# Patient Record
Sex: FEMALE | Race: WHITE | Employment: OTHER | URBAN - METROPOLITAN AREA
[De-identification: names, ages, dates, MRNs, and addresses within clinical notes are randomized per-mention and may not be internally consistent; named-entity substitution may affect disease eponyms.]

---

## 2024-05-22 ENCOUNTER — HOSPITAL ENCOUNTER (OUTPATIENT)
Dept: PHYSICAL THERAPY | Age: 89
Setting detail: RECURRING SERIES
Discharge: HOME | End: 2024-05-25
Payer: COMMERCIAL

## 2024-05-22 DIAGNOSIS — M79.604 PAIN IN RIGHT LEG: Primary | ICD-10-CM

## 2024-05-22 DIAGNOSIS — R29.898 RIGHT LEG WEAKNESS: ICD-10-CM

## 2024-05-22 DIAGNOSIS — M54.51 VERTEBROGENIC LOW BACK PAIN: ICD-10-CM

## 2024-05-22 DIAGNOSIS — M62.838 OTHER MUSCLE SPASM: ICD-10-CM

## 2024-05-22 PROCEDURE — 97162 PT EVAL MOD COMPLEX 30 MIN: CPT

## 2024-05-22 PROCEDURE — 97140 MANUAL THERAPY 1/> REGIONS: CPT

## 2024-05-22 PROCEDURE — 97110 THERAPEUTIC EXERCISES: CPT

## 2024-05-22 NOTE — PROGRESS NOTES
safety with daily activities .  THERAPEUTIC EXERCISE: (see below for minutes): Exercises per grid below to improve mobility, strength, balance and coordination. Required minimal verbal and manual cues to promote proper body alignment, promote proper body posture and promote proper body mechanics. Progressed resistance, range, repetitions and complexity of movement as indicated.  MANUAL THERAPY: (see below for minutes): Joint mobilization and Soft tissue mobilization was utilized and necessary because of the patient's restricted joint motion, painful spasm, loss of articular motion and restricted motion of soft tissue.   MODALITIES: (see below for minutes): to decrease pain   SELF CARE: (see below for minutes): Procedure(s) (per grid) utilized to improve and/or restore self-care/home management as related to dressing, bathing and grooming. Required minimal verbal cueing to facilitate activities of daily living skills and compensatory activities    Date: 5/22/24  Visit 1  Wk 1       Modalities:                                Therapeutic Exercise:                                                        Proprioceptive Activities:                                Manual Therapy:                        Functional Activities:                                            Treatment/Session Summary:    Treatment Assessment:   See evaluation note  Communication/Consultation:  Spoke with patient in regards to PT POC.  Equipment provided today:  HEP  Recommendations/Intent for next treatment session: Next visit will focus on hamstring mobility, progressive strengthening, lumbar mobility.    >Total Treatment Billable Duration:  45 minutes   Time In: 1100  Time Out: 1145    SAGE DOWELL PT         Charge Capture  [employer] Portal  Appt Desk  Attendance Report     Future Appointments   Date Time Provider Department Center   5/24/2024 10:45 AM Sage Dowell, PT [facility]   5/30/2024  9:30 AM Camilo Johnson PT [facility]   6/3/2024

## 2024-05-22 NOTE — THERAPY EVALUATION
Selam Hutchison  : 1935  Primary: [facility] (Commercial)  Secondary:  [facility][note 3] @ [place] [address]  Phone: [phone]  Fax: [phone] Plan Frequency: 1-2 sessions per week for 12 weeks    Plan of Care/Certification Expiration Date: 24        Plan of Care/Certification Expiration Date:  Plan of Care/Certification Expiration Date: 24    Frequency/Duration: Plan Frequency: 1-2 sessions per week for 12 weeks      Time In/Out:   Time In: 1100  Time Out: 1145      PT Visit Info:         Visit Count:  1                OUTPATIENT PHYSICAL THERAPY:             Initial Assessment 2024               Episode (Rehabilitation)         Treatment Diagnosis:     Pain in right leg  Vertebrogenic low back pain  Right leg weakness  Other muscle spasm  Medical/Referring Diagnosis:    Low back pain, unspecified  Other specified disorders of the skin and subcutaneous tissue  Elevated blood pressure reading without diagnosis of hypertension  Other seasonal allergic rhinitis  Other specified forms of tremor  Scoliosis, unspecified  Asthma    Referring Physician:  Marge Thomas, APRN - CNP  MD Orders:  PT Eval and Treat   Return MD Appt:  TBD  Date of Onset:  Onset Date: 24   Approx 1 month ago  Allergies:  Patient has no allergy information on record.  Restrictions/Precautions:    None      Medications Last Reviewed:  2024     SUBJECTIVE   History of Injury/Illness (Reason for Referral):  Patient is a pleasant 88 yr old female with complaints of right posterior thigh and hip pain. She also notes chronic low back pain. Patient notes that 1 month ago she began experiencing sharp pain in the posterior right thigh. She notes the pain is intermittent. It is aggravated when she bends forward, transfers from sit to stand, or lifting her leg. Walking eases her pain to a 0/10. Patient denies N/T in the leg or foot. No increase in

## 2024-05-23 ASSESSMENT — PAIN SCALES - GENERAL: PAINLEVEL_OUTOF10: 6

## 2024-05-24 ENCOUNTER — HOSPITAL ENCOUNTER (OUTPATIENT)
Dept: PHYSICAL THERAPY | Age: 89
Setting detail: RECURRING SERIES
Discharge: HOME | End: 2024-05-27
Payer: COMMERCIAL

## 2024-05-24 PROCEDURE — 97110 THERAPEUTIC EXERCISES: CPT

## 2024-05-24 PROCEDURE — 97140 MANUAL THERAPY 1/> REGIONS: CPT

## 2024-05-24 NOTE — PROGRESS NOTES
Selam Hutchison  : 1935  Primary: [facility] (Commercial)  Secondary:  [facility] @ [place] [address]  Phone: [phone]  Fax: [phone] Plan Frequency: 1-2 sessions per week for 12 weeks  Plan of Care/Certification Expiration Date: 24        Plan of Care/Certification Expiration Date:  Plan of Care/Certification Expiration Date: 24    Frequency/Duration: Plan Frequency: 1-2 sessions per week for 12 weeks      Time In/Out:   Time In: 1045  Time Out: 1130      PT Visit Info:         Visit Count:  2    OUTPATIENT PHYSICAL THERAPY:   Treatment Note 2024       Episode  (Rehabilitation)               Treatment Diagnosis:    Pain in right leg  Vertebrogenic low back pain  Right leg weakness  Other muscle spasm  Medical/Referring Diagnosis:    Low back pain, unspecified  Other specified disorders of the skin and subcutaneous tissue  Elevated blood pressure reading without diagnosis of hypertension  Other seasonal allergic rhinitis  Other specified forms of tremor  Scoliosis, unspecified  Asthma      Referring Physician:  Marge Thomas, APRN - CNP  MD Orders:  PT Eval and Treat   Return MD Appt:  TBD   Date of Onset:  Onset Date: 24   Approx 1 month ago  Allergies:   Patient has no allergy information on record.  Restrictions/Precautions:   None      Interventions Planned (Treatment may consist of any combination of the following):     See Assessment Note    Subjective Comments:   Patient notes that she is feeling sore today in the hamstring. She notes no increase in soreness post last session. The exercise is going well and notes that the tightness seems to improve with reps.   Initial Pain Level::     5 /10  Post Session Pain Level:      5  /10  Medications Last Reviewed:  2024  Updated Objective Findings:      24:  Lumbar flexion: painful  Segmental: UPA right L5 - reproduces some right hamstring

## 2024-05-30 ENCOUNTER — HOSPITAL ENCOUNTER (OUTPATIENT)
Dept: PHYSICAL THERAPY | Age: 89
Setting detail: RECURRING SERIES
End: 2024-05-30
Payer: COMMERCIAL

## 2024-05-30 PROCEDURE — 97110 THERAPEUTIC EXERCISES: CPT

## 2024-05-30 ASSESSMENT — PAIN SCALES - GENERAL: PAINLEVEL_OUTOF10: 3

## 2024-05-30 NOTE — PROGRESS NOTES
Selam Hutchison  : 1935  Primary: [facility] (Commercial)  Secondary:  [facility] @ [place] [address]  Phone: [phone]  Fax: [phone] Plan Frequency: 1-2 sessions per week for 12 weeks  Plan of Care/Certification Expiration Date: 24        Plan of Care/Certification Expiration Date:  Plan of Care/Certification Expiration Date: 24    Frequency/Duration: Plan Frequency: 1-2 sessions per week for 12 weeks      Time In/Out:   Time In: 935  Time Out: 1015      PT Visit Info:         Visit Count:  3    OUTPATIENT PHYSICAL THERAPY:   Treatment Note 2024       Episode  (Rehabilitation)               Treatment Diagnosis:    Pain in right leg  Vertebrogenic low back pain  Right leg weakness  Other muscle spasm  Medical/Referring Diagnosis:    Low back pain, unspecified  Other specified disorders of the skin and subcutaneous tissue  Elevated blood pressure reading without diagnosis of hypertension  Other seasonal allergic rhinitis  Other specified forms of tremor  Scoliosis, unspecified  Asthma      Referring Physician:  Marge Thomas, APRN - CNP  MD Orders:  PT Eval and Treat   Return MD Appt:  TBD   Date of Onset:  Onset Date: 24   Approx 1 month ago  Allergies:   Patient has no allergy information on record.  Restrictions/Precautions:   None      Interventions Planned (Treatment may consist of any combination of the following):     See Assessment Note    Subjective Comments:   Patient notes that pain comes and goes.  It is worse when she sits for a while and then gets up.   Initial Pain Level::     3/10  Post Session Pain Level:        (No worse)/10  Medications Last Reviewed:  2024  Updated Objective Findings:      24:  Lumbar flexion: painful  Segmental: UPA right L5 - reproduces some right hamstring symptoms (hypomobile)    Treatment   TREATMENT:   THERAPEUTIC ACTIVITY: ( see below for minutes):  Therapeutic activities per grid below to improve mobility, strength, balance and coordination. Required minimal visual, verbal, manual and tactile cues to improve independence and safety with daily activities .  THERAPEUTIC EXERCISE: (see below for minutes): Exercises per grid below to improve mobility, strength, balance and coordination. Required minimal verbal and manual cues to promote proper body alignment, promote proper body posture and promote proper body mechanics. Progressed resistance, range, repetitions and complexity of movement as indicated.  MANUAL THERAPY: (see below for minutes): Joint mobilization and Soft tissue mobilization was utilized and necessary because of the patient's restricted joint motion, painful spasm, loss of articular motion and restricted motion of soft tissue.   MODALITIES: (see below for minutes): to decrease pain   SELF CARE: (see below for minutes): Procedure(s) (per grid) utilized to improve and/or restore self-care/home management as related to dressing, bathing and grooming. Required minimal verbal cueing to facilitate activities of daily living skills and compensatory activities    Date: 5/22/24  Visit 1  Wk 1 5/24/24  Visit 2  Wk 1 5/30/24  Visit 3     Modalities:                                Therapeutic Exercise:  30 min 40 min       PPT x 30    Single knee to chest x 30 PPT 2x15    SKTC  B 1x15       Edu in HEP and progression DKTC  1x10          Bridging  2x10        PPT with marching  2x10        Side lying hip abduct  B 2x15        STS  ground 2x10        Clam shells  B 2x15     Proprioceptive Activities:                                Manual Therapy:  15 min -       UPA right L5 grade 3    STM right hamstring    Hip flexion and knee extension grade 2              Functional Activities:                                            Treatment/Session Summary:    Treatment Assessment:   Patient able to do all exercises without increased pain.

## 2024-06-03 ENCOUNTER — HOSPITAL ENCOUNTER (OUTPATIENT)
Dept: PHYSICAL THERAPY | Age: 89
Setting detail: RECURRING SERIES
Discharge: HOME | End: 2024-06-06
Payer: COMMERCIAL

## 2024-06-03 PROCEDURE — 97140 MANUAL THERAPY 1/> REGIONS: CPT

## 2024-06-03 PROCEDURE — 97110 THERAPEUTIC EXERCISES: CPT

## 2024-06-03 NOTE — PROGRESS NOTES
Selam Hutchison  : 1935  Primary: [facility] (Commercial)  Secondary:  [facility] @ [place] [address]  Phone: [phone]  Fax: [phone] Plan Frequency: 1-2 sessions per week for 12 weeks  Plan of Care/Certification Expiration Date: 24        Plan of Care/Certification Expiration Date:  Plan of Care/Certification Expiration Date: 24    Frequency/Duration: Plan Frequency: 1-2 sessions per week for 12 weeks      Time In/Out:   Time In: 1145  Time Out: 1230      PT Visit Info:         Visit Count:  4    OUTPATIENT PHYSICAL THERAPY:   Treatment Note 6/3/2024       Episode  (Rehabilitation)               Treatment Diagnosis:    Pain in right leg  Vertebrogenic low back pain  Right leg weakness  Other muscle spasm  Medical/Referring Diagnosis:    Low back pain, unspecified  Other specified disorders of the skin and subcutaneous tissue  Elevated blood pressure reading without diagnosis of hypertension  Other seasonal allergic rhinitis  Other specified forms of tremor  Scoliosis, unspecified  Asthma      Referring Physician:  Marge Thomas, APRN - CNP  MD Orders:  PT Eval and Treat   Return MD Appt:  TBD   Date of Onset:  Onset Date: 24   Approx 1 month ago  Allergies:   Patient has no allergy information on record.  Restrictions/Precautions:   None      Interventions Planned (Treatment may consist of any combination of the following):     See Assessment Note    Subjective Comments:   Patient notes that she is feeling well.   Initial Pain Level::     6-7 /10  Post Session Pain Level:      0  /10  Medications Last Reviewed:  6/3/2024  Updated Objective Findings:      24:  Lumbar flexion: painful  Segmental: UPA right L5 - reproduces some right hamstring symptoms (hypomobile)    6/3/24:  Lumbar flexion: non painful  STS from 21 inch: non painful     Treatment   TREATMENT:   THERAPEUTIC ACTIVITY: ( see below for

## 2024-06-05 ENCOUNTER — HOSPITAL ENCOUNTER (OUTPATIENT)
Dept: PHYSICAL THERAPY | Age: 89
Setting detail: RECURRING SERIES
Discharge: HOME | End: 2024-06-08
Payer: COMMERCIAL

## 2024-06-05 PROCEDURE — 97110 THERAPEUTIC EXERCISES: CPT

## 2024-06-05 PROCEDURE — 97140 MANUAL THERAPY 1/> REGIONS: CPT

## 2024-06-05 NOTE — PROGRESS NOTES
2x15     Proprioceptive Activities:                                Manual Therapy:  15 min - 15 min 15 min     UPA right L5 grade 3    STM right hamstring    Hip flexion and knee extension grade 2  UPA right L5 grade 3    STM right hamstring    Hip flexion and knee extension grade 2    UPA right L5 grade 3    STM right hamstring, right glut med/piriformis    Hip flexion and knee extension grade 2    Sciatic tensioners supine grade 3              Functional Activities:                                            Treatment/Session Summary:    Treatment Assessment:   Patient progressed to global conditioning program including some single leg challenges with standing marches for balance. Patient was walking better and had less pain post session.     Communication/Consultation:  None today  Equipment provided today:  HEP  Recommendations/Intent for next treatment session: endurance/hypertrophy loads phase, continued mobility    >Total Treatment Billable Duration:  45 minutes   Time In: 1145  Time Out: 1230    SAGE DOWELL PT         Charge Capture  [facility] Portal  Appt Desk  Attendance Report     Future Appointments   Date Time Provider Department Center   6/12/2024 11:45 AM Sage Dowell, PT [facility]   6/14/2024  9:15 AM Sage Dowell, PT [facility]   6/17/2024 11:45 AM Sage Dowell, PT [facility]   6/19/2024 11:45 AM Sage Dowell, PT [facility]   6/19/2024  1:30 PM Jagdish Nails, DO [facility]   6/24/2024 11:45 AM Sage Dowell, PT [facility]   6/26/2024 11:45 AM Sage Dowell, PT [facility]

## 2024-06-10 ENCOUNTER — APPOINTMENT (OUTPATIENT)
Dept: PHYSICAL THERAPY | Age: 89
End: 2024-06-10
Payer: COMMERCIAL

## 2024-06-12 ENCOUNTER — HOSPITAL ENCOUNTER (OUTPATIENT)
Dept: PHYSICAL THERAPY | Age: 89
Setting detail: RECURRING SERIES
Discharge: HOME | End: 2024-06-15
Payer: COMMERCIAL

## 2024-06-12 PROCEDURE — 97140 MANUAL THERAPY 1/> REGIONS: CPT

## 2024-06-12 PROCEDURE — 97110 THERAPEUTIC EXERCISES: CPT

## 2024-06-12 NOTE — PROGRESS NOTES
10    Heel raises 2 x 10    STS 20 inches 2 x 10      PPT with marching  2x10   HEP edu concerning piriformis stretch 3 x 30 sec for home      Side lying hip abduct  B 2x15         STS  ground 2x10         Clam shells  B 2x15      Proprioceptive Activities:                                    Manual Therapy:  15 min - 15 min 15 min 15 min     UPA right L5 grade 3    STM right hamstring    Hip flexion and knee extension grade 2  UPA right L5 grade 3    STM right hamstring    Hip flexion and knee extension grade 2    UPA right L5 grade 3    STM right hamstring, right glut med/piriformis    Hip flexion and knee extension grade 2    Sciatic tensioners supine grade 3    UPA right L5 grade 3    STM right hamstring, right glut med/piriformis    Hip flexion and knee extension grade 2    Sciatic tensioners supine grade 3            Functional Activities:                                                 Treatment/Session Summary:    Treatment Assessment:   Patient responded well to piriformis stretching and had no pain with forward bending following stretches. Added piriformis stretch to hep. Patient had no increased pain with exercises today.     Communication/Consultation:  None today  Equipment provided today:  HEP  Recommendations/Intent for next treatment session: endurance/hypertrophy loads phase, continued mobility    >Total Treatment Billable Duration:  45 minutes   Time In: 1100  Time Out: 1145    SAGE DOWELL PT         Charge Capture  [employer] Portal  Appt Desk  Attendance Report     Future Appointments   Date Time Provider Department Center   6/14/2024  9:15 AM Sage Dowell, PT [facility]   6/17/2024 11:45 AM Sage Dowell, PT [facility]   6/19/2024 11:45 AM Sage Dowell, PT [facility]   6/19/2024  1:30 PM Jagdish Nails, DO [facility]   6/24/2024 11:45 AM Sage Dowell, PT [facility]   6/26/2024 11:45 AM Sage Dowell, PT [facility]

## 2024-06-14 ENCOUNTER — HOSPITAL ENCOUNTER (OUTPATIENT)
Dept: PHYSICAL THERAPY | Age: 89
Setting detail: RECURRING SERIES
Discharge: HOME | End: 2024-06-17
Payer: COMMERCIAL

## 2024-06-14 PROCEDURE — 97140 MANUAL THERAPY 1/> REGIONS: CPT

## 2024-06-14 PROCEDURE — 97110 THERAPEUTIC EXERCISES: CPT

## 2024-06-14 NOTE — PROGRESS NOTES
Selam Hutchison  : 1935  Primary: [facility] (Commercial)  Secondary:  [facility] @ [place] [address]  Phone: [phone]  Fax: [phone] Plan Frequency: 1-2 sessions per week for 12 weeks  Plan of Care/Certification Expiration Date: 24        Plan of Care/Certification Expiration Date:  Plan of Care/Certification Expiration Date: 24    Frequency/Duration: Plan Frequency: 1-2 sessions per week for 12 weeks      Time In/Out:   Time In: 915  Time Out: 1015      PT Visit Info:         Visit Count:  7    OUTPATIENT PHYSICAL THERAPY:   Treatment Note 2024       Episode  (Rehabilitation)               Treatment Diagnosis:    Pain in right leg  Vertebrogenic low back pain  Right leg weakness  Other muscle spasm  Medical/Referring Diagnosis:    Low back pain, unspecified  Other specified disorders of the skin and subcutaneous tissue  Elevated blood pressure reading without diagnosis of hypertension  Other seasonal allergic rhinitis  Other specified forms of tremor  Scoliosis, unspecified  Asthma      Referring Physician:  Marge Thomas, APRN - CNP  MD Orders:  PT Eval and Treat   Return MD Appt:  TBD   Date of Onset:  Onset Date: 24   Approx 1 month ago  Allergies:   Patient has no allergy information on record.  Restrictions/Precautions:   None      Interventions Planned (Treatment may consist of any combination of the following):     See Assessment Note    Subjective Comments:   STS continues to be painful. Piriformis stretching. Patient notes that she has been having some unsteadiness and light headedness. She notes that she notices the unsteadiness after standing and looking upward or down. She notes that she had high bp at her last doctors appointment. She notes that after a few moments the unsteadiness eases.     Initial Pain Level::     2 /10  Post Session Pain Level:      0  /10  Medications Last Reviewed:

## 2024-06-17 ENCOUNTER — HOSPITAL ENCOUNTER (OUTPATIENT)
Dept: PHYSICAL THERAPY | Age: 89
Setting detail: RECURRING SERIES
Discharge: HOME | End: 2024-06-20
Payer: COMMERCIAL

## 2024-06-17 PROCEDURE — 97140 MANUAL THERAPY 1/> REGIONS: CPT

## 2024-06-17 PROCEDURE — 97110 THERAPEUTIC EXERCISES: CPT

## 2024-06-17 NOTE — PROGRESS NOTES
2x15    SKTC  B 1x15 Single knee to chest x 2 min    PPT x 2 min    Supine DF x 30     Single knee to chest x 2 min    PPT x 2 min    Supine DF x 30        PPT x 2 min    Single knee to chest x 1 min    Supine DF x 30 Review of symptoms related to vestibular difficulty    Reviewed recommendation to return to PCP PPT x 2 min    Single knee to chest x 1 min    Supine DF x 30 x 30 deg    Piriformis stretch 3 x 30 sec    Bent knee hamstring stretch 3 x 30 sec        Edu in HEP and progression DKTC  1x10   Clams 1x15  [id number] Clams 2 x 15 x RTB Clams 3 x 10 x GTB    Bridges 2 x 10 Education regarding POC Clams 2 x 10 x GTB    Bridges 2 x 10 x GTB iso abd         Bridging  2x10 STS 21 inches  2 x 10 STS 21 inches  2 x 10    Standing marches 2 x 10    Heel raises 2 x 10    STS 20 inches 2 x 10  STS 2 x 10 x 5#      Standing marches 2 x 10             PPT with marching  2x10   HEP edu concerning piriformis stretch 3 x 30 sec for home        Side lying hip abduct  B 2x15           STS  ground 2x10           Clam shells  B 2x15        Proprioceptive Activities:                                            Manual Therapy:  15 min - 15 min 15 min 15 min 15 min 15 min     UPA right L5 grade 3    STM right hamstring    Hip flexion and knee extension grade 2  UPA right L5 grade 3    STM right hamstring    Hip flexion and knee extension grade 2    UPA right L5 grade 3    STM right hamstring, right glut med/piriformis    Hip flexion and knee extension grade 2    Sciatic tensioners supine grade 3    UPA right L5 grade 3    STM right hamstring, right glut med/piriformis    Hip flexion and knee extension grade 2    Sciatic tensioners supine grade 3 UPA right L5 grade 3    STM right hamstring, right glut med/piriformis    Hip flexion and knee extension grade 2    Hamstring stretch 3 x 30 sec with knee bent UPA right L5 grade 3    STM right hamstring, right glut med/piriformis    Hip flexion and knee extension grade 2

## 2024-06-19 ENCOUNTER — HOSPITAL ENCOUNTER (OUTPATIENT)
Dept: PHYSICAL THERAPY | Age: 89
Setting detail: RECURRING SERIES
Discharge: HOME | End: 2024-06-22
Payer: COMMERCIAL

## 2024-06-19 ENCOUNTER — OFFICE VISIT (OUTPATIENT)
Dept: INTERNAL MEDICINE CLINIC | Facility: CLINIC | Age: 89
End: 2024-06-19
Payer: COMMERCIAL

## 2024-06-19 VITALS
HEIGHT: 65 IN | TEMPERATURE: 98 F | DIASTOLIC BLOOD PRESSURE: 70 MMHG | WEIGHT: 123 LBS | SYSTOLIC BLOOD PRESSURE: 96 MMHG | OXYGEN SATURATION: 98 % | HEART RATE: 82 BPM | BODY MASS INDEX: 20.49 KG/M2

## 2024-06-19 DIAGNOSIS — Z91.81 AT HIGH RISK FOR FALLS: ICD-10-CM

## 2024-06-19 DIAGNOSIS — G25.0 ESSENTIAL TREMOR: ICD-10-CM

## 2024-06-19 DIAGNOSIS — Z12.31 ENCOUNTER FOR SCREENING MAMMOGRAM FOR MALIGNANT NEOPLASM OF BREAST: Primary | ICD-10-CM

## 2024-06-19 DIAGNOSIS — R42 DIZZINESS: ICD-10-CM

## 2024-06-19 DIAGNOSIS — R42 ORTHOSTATIC DIZZINESS: ICD-10-CM

## 2024-06-19 DIAGNOSIS — R42 VERTIGO: ICD-10-CM

## 2024-06-19 PROBLEM — M41.9 SCOLIOSIS, UNSPECIFIED: Status: ACTIVE | Noted: 2024-06-19

## 2024-06-19 PROBLEM — J30.2 SEASONAL ALLERGIES: Status: ACTIVE | Noted: 2024-06-19

## 2024-06-19 LAB
CHOLEST SERPL-MCNC: 222 MG/DL (ref 0–200)
ERYTHROCYTE [DISTWIDTH] IN BLOOD BY AUTOMATED COUNT: 14.7 % (ref 11.9–14.6)
HCT VFR BLD AUTO: 39.2 % (ref 35.8–46.3)
HDLC SERPL-MCNC: 68 MG/DL (ref 40–60)
HDLC SERPL: 3.2 (ref 0–5)
HGB BLD-MCNC: 12.2 G/DL (ref 11.7–15.4)
LDLC SERPL CALC-MCNC: 127 MG/DL (ref 0–100)
MCH RBC QN AUTO: 28.1 PG (ref 26.1–32.9)
MCHC RBC AUTO-ENTMCNC: 31.1 G/DL (ref 31.4–35)
MCV RBC AUTO: 90.3 FL (ref 82–102)
NRBC # BLD: 0 K/UL (ref 0–0.2)
PLATELET # BLD AUTO: 304 K/UL (ref 150–450)
PMV BLD AUTO: 9.6 FL (ref 9.4–12.3)
RBC # BLD AUTO: 4.34 M/UL (ref 4.05–5.2)
TRIGL SERPL-MCNC: 132 MG/DL (ref 0–150)
TSH, 3RD GENERATION: 2.1 UIU/ML (ref 0.27–4.2)
VLDLC SERPL CALC-MCNC: 26 MG/DL (ref 6–23)
WBC # BLD AUTO: 9 K/UL (ref 4.3–11.1)

## 2024-06-19 PROCEDURE — 1036F TOBACCO NON-USER: CPT | Performed by: INTERNAL MEDICINE

## 2024-06-19 PROCEDURE — 97110 THERAPEUTIC EXERCISES: CPT

## 2024-06-19 PROCEDURE — 99214 OFFICE O/P EST MOD 30 MIN: CPT | Performed by: INTERNAL MEDICINE

## 2024-06-19 PROCEDURE — 1123F ACP DISCUSS/DSCN MKR DOCD: CPT | Performed by: INTERNAL MEDICINE

## 2024-06-19 PROCEDURE — 1090F PRES/ABSN URINE INCON ASSESS: CPT | Performed by: INTERNAL MEDICINE

## 2024-06-19 PROCEDURE — G8427 DOCREV CUR MEDS BY ELIG CLIN: HCPCS | Performed by: INTERNAL MEDICINE

## 2024-06-19 PROCEDURE — 97140 MANUAL THERAPY 1/> REGIONS: CPT

## 2024-06-19 PROCEDURE — 93000 ELECTROCARDIOGRAM COMPLETE: CPT | Performed by: INTERNAL MEDICINE

## 2024-06-19 PROCEDURE — G8420 CALC BMI NORM PARAMETERS: HCPCS | Performed by: INTERNAL MEDICINE

## 2024-06-19 RX ORDER — MECLIZINE HYDROCHLORIDE 25 MG/1
25 TABLET ORAL 3 TIMES DAILY PRN
Qty: 30 TABLET | Refills: 1 | Status: SHIPPED | OUTPATIENT
Start: 2024-06-19

## 2024-06-19 RX ORDER — MONTELUKAST SODIUM 10 MG/1
10 TABLET ORAL
COMMUNITY
Start: 2023-08-02 | End: 2024-06-19 | Stop reason: SDUPTHER

## 2024-06-19 RX ORDER — DIPHENHYDRAMINE HYDROCHLORIDE 25 MG/1
1 TABLET ORAL DAILY
COMMUNITY

## 2024-06-19 RX ORDER — RALOXIFENE HYDROCHLORIDE 60 MG/1
60 TABLET, FILM COATED ORAL DAILY
COMMUNITY
Start: 2023-06-29 | End: 2024-06-19 | Stop reason: SDUPTHER

## 2024-06-19 RX ORDER — TRAZODONE HYDROCHLORIDE 50 MG/1
50 TABLET ORAL NIGHTLY
COMMUNITY
Start: 2023-10-17

## 2024-06-19 RX ORDER — PROPRANOLOL HCL 60 MG
60 CAPSULE, EXTENDED RELEASE 24HR ORAL DAILY
Qty: 90 CAPSULE | Refills: 3 | Status: SHIPPED | OUTPATIENT
Start: 2024-06-19

## 2024-06-19 RX ORDER — PROPRANOLOL HYDROCHLORIDE 80 MG/1
80 CAPSULE, EXTENDED RELEASE ORAL DAILY
COMMUNITY
Start: 2024-02-18 | End: 2024-06-19 | Stop reason: SDUPTHER

## 2024-06-19 RX ORDER — MONTELUKAST SODIUM 10 MG/1
10 TABLET ORAL NIGHTLY
Qty: 90 TABLET | Refills: 3 | Status: SHIPPED | OUTPATIENT
Start: 2024-06-19

## 2024-06-19 RX ORDER — RALOXIFENE HYDROCHLORIDE 60 MG/1
60 TABLET, FILM COATED ORAL DAILY
Qty: 90 TABLET | Refills: 3 | Status: SHIPPED | OUTPATIENT
Start: 2024-06-19

## 2024-06-19 RX ORDER — MECLIZINE HYDROCHLORIDE 25 MG/1
25 TABLET ORAL 3 TIMES DAILY PRN
COMMUNITY
Start: 2022-07-14 | End: 2024-06-19 | Stop reason: SDUPTHER

## 2024-06-19 SDOH — ECONOMIC STABILITY: FOOD INSECURITY: WITHIN THE PAST 12 MONTHS, YOU WORRIED THAT YOUR FOOD WOULD RUN OUT BEFORE YOU GOT MONEY TO BUY MORE.: NEVER TRUE

## 2024-06-19 SDOH — ECONOMIC STABILITY: FOOD INSECURITY: WITHIN THE PAST 12 MONTHS, THE FOOD YOU BOUGHT JUST DIDN'T LAST AND YOU DIDN'T HAVE MONEY TO GET MORE.: NEVER TRUE

## 2024-06-19 SDOH — ECONOMIC STABILITY: INCOME INSECURITY: HOW HARD IS IT FOR YOU TO PAY FOR THE VERY BASICS LIKE FOOD, HOUSING, MEDICAL CARE, AND HEATING?: NOT HARD AT ALL

## 2024-06-19 SDOH — ECONOMIC STABILITY: HOUSING INSECURITY
IN THE LAST 12 MONTHS, WAS THERE A TIME WHEN YOU DID NOT HAVE A STEADY PLACE TO SLEEP OR SLEPT IN A SHELTER (INCLUDING NOW)?: NO

## 2024-06-19 ASSESSMENT — PATIENT HEALTH QUESTIONNAIRE - PHQ9
SUM OF ALL RESPONSES TO PHQ QUESTIONS 1-9: 0
1. LITTLE INTEREST OR PLEASURE IN DOING THINGS: NOT AT ALL
SUM OF ALL RESPONSES TO PHQ9 QUESTIONS 1 & 2: 0
2. FEELING DOWN, DEPRESSED OR HOPELESS: NOT AT ALL
SUM OF ALL RESPONSES TO PHQ QUESTIONS 1-9: 0

## 2024-06-19 NOTE — PROGRESS NOTES
Selam was seen today for established new doctor, medication refill and hypertension.    Diagnoses and all orders for this visit:    Encounter for screening mammogram for malignant neoplasm of breast  -     [facility] DIGITAL SCREEN W OR WO CAD BILATERAL; Future    At high risk for falls  -     [facility] - Physical Therapy, [facility]    Essential tremor  -     Lipid Panel; Future  -     Lipid Panel    Dizziness  -     [facility] - Physical Therapy, [facility]  -     CBC; Future  -     TSH; Future  -     Lipid Panel; Future  -     EKG 12 Lead  -     Lipid Panel  -     TSH  -     CBC    Orthostatic dizziness  -     [facility] - Physical Therapy, [facility]  -     TSH; Future  -     Lipid Panel; Future  -     Lipid Panel  -     TSH    Vertigo  -     [facility] - Physical Therapy, [facility]    Other orders  -     montelukast (SINGULAIR) 10 MG tablet; Take 1 tablet by mouth nightly  -     raloxifene (EVISTA) 60 MG tablet; Take 1 tablet by mouth daily  -     meclizine (ANTIVERT) 25 MG tablet; Take 1 tablet by mouth 3 times daily as needed for Dizziness  -     propranolol (INDERAL LA) 60 MG extended release capsule; Take 1 capsule by mouth daily          Selam Hutchison is a 88 y.o. female    Chief Complaint   Patient presents with    Established New Doctor     Changing PCP due to insurance    Medication Refill    Hypertension     Per physical therapist     Going to pt at [facility] for back pain  Has sciatica and scoliosis  Vertigo-gets dizzy when rolls over in bed  Has dizzy spell when walking just a short distance-lightheaded    Spinning dizziness sai happens every 4 weeks    Enjoys reading  Driving mostly local .   Family here .sister [place] with dementia  Wt Readings from Last 3 Encounters:   06/19/24 55.8 kg (123 lb)       Goals-to improve dizziness work in yard and walking  Wants to get balance and dizziness therapy          No results found for any previous visit.

## 2024-06-19 NOTE — PROGRESS NOTES
Selam Hutchison  : 1935  Primary: [facility] (Commercial)  Secondary:  [facility] @ [place] [address]  Phone: [phone]  Fax: [phone] Plan Frequency: 1-2 sessions per week for 12 weeks  Plan of Care/Certification Expiration Date: 24        Plan of Care/Certification Expiration Date:  Plan of Care/Certification Expiration Date: 24    Frequency/Duration: Plan Frequency: 1-2 sessions per week for 12 weeks      Time In/Out:   Time In: 1145  Time Out: 1230      PT Visit Info:         Visit Count:  9    OUTPATIENT PHYSICAL THERAPY:   Treatment Note 2024       Episode  (Rehabilitation)               Treatment Diagnosis:    Pain in right leg  Vertebrogenic low back pain  Right leg weakness  Other muscle spasm  Medical/Referring Diagnosis:    Low back pain, unspecified  Other specified disorders of the skin and subcutaneous tissue  Elevated blood pressure reading without diagnosis of hypertension  Other seasonal allergic rhinitis  Other specified forms of tremor  Scoliosis, unspecified  Asthma      Referring Physician:  Marge Thomas, APRN - CNP  MD Orders:  PT Eval and Treat   Return MD Appt:  TBD   Date of Onset:  Onset Date: 24   Approx 1 month ago  Allergies:   Hydrocodone-acetaminophen  Restrictions/Precautions:   None      Interventions Planned (Treatment may consist of any combination of the following):     See Assessment Note    Subjective Comments:   Patient notes she notes that was able to tight left shoe without pain today. Right shoe was slightly tight. Patient notes she would like to work on getting back to working in the yard.     Initial Pain Level::     0 /10  Post Session Pain Level:      0  /10  Medications Last Reviewed:  2024  Updated Objective Findings:      24:  Lumbar flexion: painful  Segmental: UPA right L5 - reproduces some right hamstring symptoms (hypomobile)    6/3/24:  Lumbar

## 2024-06-24 ENCOUNTER — HOSPITAL ENCOUNTER (OUTPATIENT)
Dept: PHYSICAL THERAPY | Age: 89
Setting detail: RECURRING SERIES
Discharge: HOME | End: 2024-06-27
Payer: COMMERCIAL

## 2024-06-24 PROCEDURE — 97140 MANUAL THERAPY 1/> REGIONS: CPT

## 2024-06-24 PROCEDURE — 97110 THERAPEUTIC EXERCISES: CPT

## 2024-06-24 NOTE — PROGRESS NOTES
Selam Hutchison  : 1935  Primary: [facility] (Commercial)  Secondary:  [facility] [place] [address]  Phone: [phone]  Fax: [phone] Plan Frequency: 1-2 sessions per week for 12 weeks  Plan of Care/Certification Expiration Date: 24        Plan of Care/Certification Expiration Date:  Plan of Care/Certification Expiration Date: 24    Frequency/Duration: Plan Frequency: 1-2 sessions per week for 12 weeks      Time In/Out:   Time In: 1145  Time Out: 1230      PT Visit Info:         Visit Count:  10    OUTPATIENT PHYSICAL THERAPY:   Treatment Note 2024       Episode  (Rehabilitation)               Treatment Diagnosis:    Pain in right leg  Vertebrogenic low back pain  Right leg weakness  Other muscle spasm  Medical/Referring Diagnosis:    Low back pain, unspecified  Other specified disorders of the skin and subcutaneous tissue  Elevated blood pressure reading without diagnosis of hypertension  Other seasonal allergic rhinitis  Other specified forms of tremor  Scoliosis, unspecified  Asthma      Referring Physician:  Marge Thomas, APRN - CNP  MD Orders:  PT Eval and Treat   Return MD Appt:  TBD   Date of Onset:  Onset Date: 24   Approx 1 month ago  Allergies:   Hydrocodone-acetaminophen  Restrictions/Precautions:   None      Interventions Planned (Treatment may consist of any combination of the following):     See Assessment Note    Subjective Comments:   Patient notes some soreness in the right lateral hip. Her hamstring has not been painful. Patient notes the symptoms ease after stretching. Patient had her appointment with PCP. She was noted to have orthostatic hypotension.    Initial Pain Level::     0 /10  Post Session Pain Level:      0  /10  Medications Last Reviewed:  2024  Updated Objective Findings:      24:  Lumbar flexion: painful  Segmental: UPA right L5 - reproduces some right hamstring

## 2024-06-26 ENCOUNTER — HOSPITAL ENCOUNTER (OUTPATIENT)
Dept: PHYSICAL THERAPY | Age: 89
Setting detail: RECURRING SERIES
Discharge: HOME | End: 2024-06-29
Payer: COMMERCIAL

## 2024-06-26 PROCEDURE — 97110 THERAPEUTIC EXERCISES: CPT

## 2024-06-26 PROCEDURE — 97140 MANUAL THERAPY 1/> REGIONS: CPT

## 2024-06-26 NOTE — PROGRESS NOTES
Selam Hutchison  : 1935  Primary: [facility] (Commercial)  Secondary:  [facility] @ [place] [address]  Phone: [phone]  Fax: [phone] Plan Frequency: 1-2 sessions per week for 12 weeks  Plan of Care/Certification Expiration Date: 24        Plan of Care/Certification Expiration Date:  Plan of Care/Certification Expiration Date: 24    Frequency/Duration: Plan Frequency: 1-2 sessions per week for 12 weeks      Time In/Out:   Time In: 1145  Time Out: 1230      PT Visit Info:         Visit Count:  11    OUTPATIENT PHYSICAL THERAPY:   Treatment Note 2024       Episode  (Rehabilitation)               Treatment Diagnosis:    Pain in right leg  Vertebrogenic low back pain  Right leg weakness  Other muscle spasm  Medical/Referring Diagnosis:    Low back pain, unspecified  Other specified disorders of the skin and subcutaneous tissue  Elevated blood pressure reading without diagnosis of hypertension  Other seasonal allergic rhinitis  Other specified forms of tremor  Scoliosis, unspecified  Asthma      Referring Physician:  Marge Thomas, APRN - CNP  MD Orders:  PT Eval and Treat   Return MD Appt:  TBD   Date of Onset:  Onset Date: 24   Approx 1 month ago  Allergies:   Hydrocodone-acetaminophen  Restrictions/Precautions:   None      Interventions Planned (Treatment may consist of any combination of the following):     See Assessment Note    Subjective Comments:   Patient notes she is a little sore in the posterior hip today.     Initial Pain Level::     0 /10  Post Session Pain Level:      0  /10  Medications Last Reviewed:  2024  Updated Objective Findings:      24:  Lumbar flexion: painful  Segmental: UPA right L5 - reproduces some right hamstring symptoms (hypomobile)    6/3/24:  Lumbar flexion: non painful  STS from 21 inch: non painful     24:   Seated flexion to shoes: left no pain, right some

## 2024-07-03 ENCOUNTER — HOSPITAL ENCOUNTER (OUTPATIENT)
Dept: PHYSICAL THERAPY | Age: 89
Setting detail: RECURRING SERIES
Discharge: HOME | End: 2024-07-06
Payer: COMMERCIAL

## 2024-07-03 PROCEDURE — 97140 MANUAL THERAPY 1/> REGIONS: CPT

## 2024-07-03 PROCEDURE — 97110 THERAPEUTIC EXERCISES: CPT

## 2024-07-03 NOTE — PROGRESS NOTES
[email or web address] Portal  Appt Desk  Attendance Report     Future Appointments   Date Time Provider Department Center   7/8/2024  3:30 PM Sage Dowell, PT [facility]   7/10/2024  4:15 PM Sage Dowell, PT [facility]   7/15/2024  3:30 PM Sage Dowell, PT [facility]   7/16/2024 11:45 AM Jagdish Nails, DO [facility]   7/17/2024 11:30 AM [facility] MOBILE MAMMO 1 [facility]   7/17/2024  4:15 PM Sage Dowell, PT [facility]   7/22/2024  3:30 PM Sage Dowell, PT [facility]   7/24/2024  2:45 PM Camilo Johnson, PT [facility]   7/29/2024  3:30 PM Sage Dowell, PT [facility]   7/31/2024  4:15 PM Sage Dowell, PT [facility]

## 2024-07-08 ENCOUNTER — HOSPITAL ENCOUNTER (OUTPATIENT)
Dept: PHYSICAL THERAPY | Age: 89
Setting detail: RECURRING SERIES
Discharge: HOME | End: 2024-07-11
Payer: COMMERCIAL

## 2024-07-08 PROCEDURE — 97140 MANUAL THERAPY 1/> REGIONS: CPT

## 2024-07-08 PROCEDURE — 97110 THERAPEUTIC EXERCISES: CPT

## 2024-07-08 NOTE — PROGRESS NOTES
knee to chest x 1 min    Supine DF x 30 x 30 deg    Piriformis stretch 3 x 30 sec    Bent knee hamstring stretch 3 x 30 sec    Seated lumbar flexion x 20    PPT x 2 min    Single knee to chest x 1 min    Supine DF x 30 x 30 deg    Piriformis stretch 3 x 30 sec    Bent knee hamstring stretch 3 x 30 sec Seated lumbar flexion x 10    PPT x 2 min    Piriformis stretch 3 x 30 sec    Bent knee hamstring stretch 3 x 30 sec    PPT x 2 min    SKTC x 2 min    Piriformis stretch 3 x 30 sec Single knee to chest stretch 10 sec hold x6 B    B piriformis stretch 30 sec hold x4    PPT x15    Bent knee hamstring stretch B LE PPT x 2 min    SKTC x 2 min    Piriformis stretch 3 x 30 sec       Clams 2 x 10 x GTB    Bridges 2 x 10 x GTB iso abd       Side lying clamshells 3x10 B Hamstring isometrics sitting 2 x 45 sec    Seated hamstring curls 3 x 10 x GTB    STS 2 x 10 x 5#      Standing marches 2 x 10        Deadlift patterning 2 x 10 no wt  1 x 10 x 7.5# Deadlift patterning 2 x 10 no wt  1 x 10 x 7.5#    Standing marches 2 x 10      Bridges 2 x 10 x GTB    Clams 2 x 10 x GTB    Clams 3 x 10 x GTB Low bridges 3x10 Deadlift patterning 2 x 10 no wt  1 x 10 x 7.5#    Heel raises x 30                                         Proprioceptive Activities:                                    Manual Therapy: 15 min 15 min 15 min 30 min 12 min 15 min    UPA right L5 grade 3    STM right hamstring, right glut med/piriformis    Hip flexion and knee extension grade 2    UPA right L5 grade 3    STM right hamstring, right glut med/piriformis    Hip flexion and knee extension grade 2    UPA right L5 grade 3    STM right hamstring, right glut med/piriformis    Hip flexion and knee extension grade 2    UPA right L5 grade 3    STM right hamstring, right glut med/piriformis    Hip flexion and knee extension grade 2    IASTM to R hamstring, glutes, piriformis with roller in side lying STM right hamstring, right glut med/piriformis            Functional

## 2024-07-10 ENCOUNTER — HOSPITAL ENCOUNTER (OUTPATIENT)
Dept: PHYSICAL THERAPY | Age: 89
Setting detail: RECURRING SERIES
Discharge: HOME | End: 2024-07-13
Payer: COMMERCIAL

## 2024-07-10 PROCEDURE — 97110 THERAPEUTIC EXERCISES: CPT

## 2024-07-10 PROCEDURE — 97140 MANUAL THERAPY 1/> REGIONS: CPT

## 2024-07-10 NOTE — PROGRESS NOTES
knee extension grade 2    IASTM to R hamstring, glutes, piriformis with roller in side lying STM right hamstring, right glut med/piriformis STM right hamstring, right glut med/piriformis                Functional Activities:                                                      Treatment/Session Summary:    Treatment Assessment:   See d/c note    Communication/Consultation:  None today  Equipment provided today:  HEP  Recommendations/Intent for next treatment session: endurance/hypertrophy loads phase, continued mobility    >Total Treatment Billable Duration:  45 minutes   Time In: 0415  Time Out: 0500    SAGE DOWELL PT         Charge Capture  [facility] Portal  Appt Desk  Attendance Report     Future Appointments   Date Time Provider Department Center   7/15/2024  3:30 PM Sage Dowell, PT [facility]   7/16/2024 11:45 AM Jagdish Nails, DO [facility]   7/17/2024 11:30 AM [facility] MOBILE MAMMO 1 [facility]   7/17/2024  4:15 PM Sage Dowell, PT [facility]   7/22/2024  3:30 PM Sage Dowell, PT [facility]   7/24/2024  2:00 PM Sage Dowell, PT [facility]   7/29/2024  3:30 PM Sage Dowell, PT [facility]   7/31/2024  4:15 PM Sage Dowell, PT [facility]

## 2024-07-11 NOTE — THERAPY DISCHARGE
QOL.      Regarding Selam Hutchison's therapy, I certify that the treatment plan above will be carried out by a therapist or under their direction.  Thank you for this referral,  REAL KIM PT     Referring Physician Signature: Marge Thomas, APR* _______________________________ Date _____________        Charge Capture  Appt Desk  Attendance Report

## 2024-07-15 ENCOUNTER — HOSPITAL ENCOUNTER (OUTPATIENT)
Dept: PHYSICAL THERAPY | Age: 89
Setting detail: RECURRING SERIES
Discharge: HOME | End: 2024-07-18
Attending: INTERNAL MEDICINE
Payer: MEDICARE

## 2024-07-15 ENCOUNTER — APPOINTMENT (OUTPATIENT)
Dept: PHYSICAL THERAPY | Age: 89
End: 2024-07-15
Payer: MEDICARE

## 2024-07-15 DIAGNOSIS — R26.89 BALANCE PROBLEM: ICD-10-CM

## 2024-07-15 DIAGNOSIS — R26.81 UNSTEADINESS ON FEET: Primary | ICD-10-CM

## 2024-07-15 DIAGNOSIS — M62.81 MUSCLE WEAKNESS (GENERALIZED): ICD-10-CM

## 2024-07-15 DIAGNOSIS — R26.89 OTHER ABNORMALITIES OF GAIT AND MOBILITY: ICD-10-CM

## 2024-07-15 PROCEDURE — 97162 PT EVAL MOD COMPLEX 30 MIN: CPT

## 2024-07-15 NOTE — PROGRESS NOTES
Selam Hutchison  : 1935  Primary: [facility] Medicare Complete (Medicare Managed)  Secondary:  [facility] @ [place] [address]  Phone: [phone]  Fax: [phone] Plan Frequency: 1-2 sessions per week    Plan of Care/Certification Expiration Date: 10/14/24        Plan of Care/Certification Expiration Date:  Plan of Care/Certification Expiration Date: 10/14/24    Frequency/Duration:   Plan Frequency: 1-2 sessions per week      Time In/Out:   Time In: 0330  Time Out: 415      PT Visit Info:         Visit Count:  15    OUTPATIENT PHYSICAL THERAPY:   Treatment Note 7/15/2024       Episode  (Unsteadiness and balance rehabilitation)               Treatment Diagnosis:    Unsteadiness on feet  Other abnormalities of gait and mobility  Balance problem  Muscle weakness (generalized)  Medical/Referring Diagnosis:    At high risk for falls  Dizziness  Orthostatic dizziness  Vertigo      Referring Physician:  Jagdish Nails DO MD Orders:  PT Eval and Treat   Return MD Appt:  TBD   Date of Onset:  Onset Date: 22     Allergies:   Hydrocodone-acetaminophen  Restrictions/Precautions:   None      Interventions Planned (Treatment may consist of any combination of the following):     See Assessment Note    Subjective Comments:   See eval note  Initial Pain Level::     0/10  Post Session Pain Level:       0/10  Medications Last Reviewed:  7/15/2024  Updated Objective Findings:  See Evaluation Note from today  Treatment   TREATMENT:   THERAPEUTIC ACTIVITY: ( see below for minutes): Therapeutic activities per grid below to improve mobility, strength, balance and coordination. Required minimal visual, verbal, manual and tactile cues to improve independence and safety with daily activities .  THERAPEUTIC EXERCISE: (see below for minutes): Exercises per grid below to improve mobility, strength, balance and coordination. Required minimal verbal and manual cues to promote proper

## 2024-07-15 NOTE — THERAPY EVALUATION
Selam Hutchison  : 1935  Primary: [facility] Medicare Complete (Medicare Managed)  Secondary:  [facility] @ [place] [address]  Phone: [phone]  Fax: [phone] Plan Frequency: 1-2 sessions per week    Plan of Care/Certification Expiration Date: 10/14/24        Plan of Care/Certification Expiration Date:  Plan of Care/Certification Expiration Date: 10/14/24    Frequency/Duration:   Plan Frequency: 1-2 sessions per week      Time In/Out:   Time In: 0330  Time Out: 415      PT Visit Info:         Visit Count:  15                OUTPATIENT PHYSICAL THERAPY:             Initial Assessment 7/15/2024               Episode (Unsteadiness and balance rehabilitation)         Treatment Diagnosis:     Unsteadiness on feet  Other abnormalities of gait and mobility  Balance problem  Muscle weakness (generalized)  Medical/Referring Diagnosis:    At high risk for falls  Dizziness  Orthostatic dizziness  Vertigo      Referring Physician:  Jagdish Nails DO MD Orders:  PT Eval and Treat   Return MD Appt:  TBD  Date of Onset:  Onset Date: 22   (Gradual decline in balance and stability over several years with noticeable difficulties over last 2 years)  Allergies:  Hydrocodone-acetaminophen  Restrictions/Precautions:    None      Medications Last Reviewed:  7/15/2024     SUBJECTIVE   History of Injury/Illness (Reason for Referral):  Patient is a pleasant 88 yr old female with complaints of unsteadiness, fear of falling, and balance difficulties. Patient lives alone independently. She notes that when she ambulates on grass or soft surfaces, when she ambulates in crowded places, or when she is bending lifting, reaching in her yard, she has fear of falling and must take care to prevent losing her balance. She notes that she has recently had changes to her blood pressure medication to assist with some light headedness. This light headedness has improved. However, she has

## 2024-07-16 ENCOUNTER — OFFICE VISIT (OUTPATIENT)
Dept: INTERNAL MEDICINE CLINIC | Facility: CLINIC | Age: 89
End: 2024-07-16
Payer: MEDICARE

## 2024-07-16 VITALS
SYSTOLIC BLOOD PRESSURE: 130 MMHG | DIASTOLIC BLOOD PRESSURE: 78 MMHG | WEIGHT: 125 LBS | TEMPERATURE: 98.3 F | OXYGEN SATURATION: 100 % | HEIGHT: 65 IN | BODY MASS INDEX: 20.83 KG/M2 | HEART RATE: 72 BPM

## 2024-07-16 DIAGNOSIS — R42 DIZZINESS: ICD-10-CM

## 2024-07-16 DIAGNOSIS — Z91.81 AT HIGH RISK FOR FALLS: Primary | ICD-10-CM

## 2024-07-16 DIAGNOSIS — R42 VERTIGO: ICD-10-CM

## 2024-07-16 DIAGNOSIS — R42 ORTHOSTATIC DIZZINESS: ICD-10-CM

## 2024-07-16 DIAGNOSIS — Z00.00 MEDICARE ANNUAL WELLNESS VISIT, SUBSEQUENT: ICD-10-CM

## 2024-07-16 PROCEDURE — 1036F TOBACCO NON-USER: CPT | Performed by: INTERNAL MEDICINE

## 2024-07-16 PROCEDURE — G0439 PPPS, SUBSEQ VISIT: HCPCS | Performed by: INTERNAL MEDICINE

## 2024-07-16 PROCEDURE — G8420 CALC BMI NORM PARAMETERS: HCPCS | Performed by: INTERNAL MEDICINE

## 2024-07-16 PROCEDURE — G8427 DOCREV CUR MEDS BY ELIG CLIN: HCPCS | Performed by: INTERNAL MEDICINE

## 2024-07-16 PROCEDURE — 99213 OFFICE O/P EST LOW 20 MIN: CPT | Performed by: INTERNAL MEDICINE

## 2024-07-16 PROCEDURE — 1123F ACP DISCUSS/DSCN MKR DOCD: CPT | Performed by: INTERNAL MEDICINE

## 2024-07-16 PROCEDURE — 1090F PRES/ABSN URINE INCON ASSESS: CPT | Performed by: INTERNAL MEDICINE

## 2024-07-16 ASSESSMENT — PAIN SCALES - GENERAL: PAINLEVEL_OUTOF10: 0

## 2024-07-16 NOTE — PATIENT INSTRUCTIONS

## 2024-07-16 NOTE — PROGRESS NOTES
Selam was seen today for follow-up and medicare awv.    Diagnoses and all orders for this visit:    At high risk for falls    Orthostatic dizziness  Comments:  better  on lower dose bb    Vertigo  Comments:  seems havr resolved, pt for balance    Dizziness  Comments:  pt can help    Medicare annual wellness visit, subsequent          Selam Hutchison is a 88 y.o. female    Chief Complaint   Patient presents with    Follow-up     1 month     Medicare AWV     Lowered propranolol  from 80--60 mg has helped with balance  labs      Office Visit on 06/19/2024   Component Date Value Ref Range Status    Cholesterol, Total 06/19/2024 222 (H)  0 - 200 MG/DL Final    Comment: Borderline High: 200-239 mg/dL  High: Greater than or equal to 240 mg/dL      Triglycerides 06/19/2024 132  0 - 150 MG/DL Final    Comment: Borderline High: 150-199 mg/dL, High: 200-499 mg/dL  Very High: Greater than or equal to 500 mg/dL      HDL 06/19/2024 68 (H)  40 - 60 MG/DL Final    LDL Cholesterol 06/19/2024 127 (H)  0 - 100 MG/DL Final    Comment: Near Optimal: 100-129 mg/dL  Borderline High: 130-159, High: 160-189 mg/dL  Very High: Greater than or equal to 190 mg/dL      VLDL Cholesterol Calculated 06/19/2024 26 (H)  6 - 23 MG/DL Final    Chol/HDL Ratio 06/19/2024 3.2  0.0 - 5.0   Final    TSH, 3rd Generation 06/19/2024 2.100  0.270 - 4.200 uIU/mL Final    WBC 06/19/2024 9.0  4.3 - 11.1 K/uL Final    RBC 06/19/2024 4.34  4.05 - 5.2 M/uL Final    Hemoglobin 06/19/2024 12.2  11.7 - 15.4 g/dL Final    Hematocrit 06/19/2024 39.2  35.8 - 46.3 % Final    MCV 06/19/2024 90.3  82 - 102 FL Final    MCH 06/19/2024 28.1  26.1 - 32.9 PG Final    MCHC 06/19/2024 31.1 (L)  31.4 - 35.0 g/dL Final    RDW 06/19/2024 14.7 (H)  11.9 - 14.6 % Final    Platelets 06/19/2024 304  150 - 450 K/uL Final    MPV 06/19/2024 9.6  9.4 - 12.3 FL Final    nRBC 06/19/2024 0.00  0.0 - 0.2 K/uL Final    **Note: Absolute NRBC parameter is now reported with Hemogram**        Past

## 2024-07-17 ENCOUNTER — HOSPITAL ENCOUNTER (OUTPATIENT)
Dept: PHYSICAL THERAPY | Age: 89
Setting detail: RECURRING SERIES
Discharge: HOME | End: 2024-07-20
Attending: INTERNAL MEDICINE
Payer: MEDICARE

## 2024-07-17 ENCOUNTER — HOSPITAL ENCOUNTER (OUTPATIENT)
Dept: MAMMOGRAPHY | Age: 89
Discharge: HOME | End: 2024-07-20
Attending: INTERNAL MEDICINE
Payer: MEDICARE

## 2024-07-17 ENCOUNTER — APPOINTMENT (OUTPATIENT)
Dept: PHYSICAL THERAPY | Age: 89
End: 2024-07-17
Payer: MEDICARE

## 2024-07-17 DIAGNOSIS — Z12.31 ENCOUNTER FOR SCREENING MAMMOGRAM FOR MALIGNANT NEOPLASM OF BREAST: ICD-10-CM

## 2024-07-17 PROCEDURE — 77067 SCR MAMMO BI INCL CAD: CPT

## 2024-07-17 PROCEDURE — 97140 MANUAL THERAPY 1/> REGIONS: CPT

## 2024-07-17 PROCEDURE — 97110 THERAPEUTIC EXERCISES: CPT

## 2024-07-17 NOTE — PROGRESS NOTES
coordination. Required minimal verbal and manual cues to promote proper body alignment, promote proper body posture and promote proper body mechanics. Progressed resistance, range, repetitions and complexity of movement as indicated.  MANUAL THERAPY: (see below for minutes): Joint mobilization and Soft tissue mobilization was utilized and necessary because of the patient's restricted joint motion, painful spasm, loss of articular motion and restricted motion of soft tissue.   MODALITIES: (see below for minutes): to decrease pain   SELF CARE: (see below for minutes): Procedure(s) (per grid) utilized to improve and/or restore self-care/home management as related to dressing, bathing and grooming. Required minimal verbal cueing to facilitate activities of daily living skills and compensatory activities    Date: 7/15/24  Visit 1  EVAL 7/17/24  Visit 2      Modalities:                                Therapeutic Exercise:  30 min        Nustep lvl5 x 5 min        LAQ 3 x 10 x 10#    Hamstring curls 3 x 10 x GTB    SL step ups on 6 inch box 2 x 10        Heel toe walking in bars    Hurtle stepping in bars - firm -- soft mat                              Proprioceptive Activities:                                Manual Therapy:  15 min        STM right glut ERs and hamstring              Functional Activities:                                            Treatment/Session Summary:    Treatment Assessment:   Patient responded well to STM. Began hurtle stepping and tolerated soft mat well.     Communication/Consultation:  Spoke with patient in regards to PT POC.  Equipment provided today:  HEP  Recommendations/Intent for next treatment session: Next visit will focus on BALANCE, SINGLE LEG STABILITY FRONTAL PLANE COMPENSATORY PATTERNS.    >Total Treatment Billable Duration:  45 minutes   Time In: 0415  Time Out: 0500    REAL KIM PT         Charge Capture  Events  [facility] Portal  Appt Desk  Attendance Report     Future

## 2024-07-22 ENCOUNTER — APPOINTMENT (OUTPATIENT)
Dept: PHYSICAL THERAPY | Age: 89
End: 2024-07-22
Payer: MEDICARE

## 2024-07-22 ENCOUNTER — HOSPITAL ENCOUNTER (OUTPATIENT)
Dept: PHYSICAL THERAPY | Age: 89
Setting detail: RECURRING SERIES
Discharge: HOME | End: 2024-07-25
Attending: INTERNAL MEDICINE
Payer: MEDICARE

## 2024-07-22 PROCEDURE — 97110 THERAPEUTIC EXERCISES: CPT

## 2024-07-22 PROCEDURE — 97140 MANUAL THERAPY 1/> REGIONS: CPT

## 2024-07-22 NOTE — PROGRESS NOTES
and coordination. Required minimal verbal and manual cues to promote proper body alignment, promote proper body posture and promote proper body mechanics. Progressed resistance, range, repetitions and complexity of movement as indicated.  MANUAL THERAPY: (see below for minutes): Joint mobilization and Soft tissue mobilization was utilized and necessary because of the patient's restricted joint motion, painful spasm, loss of articular motion and restricted motion of soft tissue.   MODALITIES: (see below for minutes): to decrease pain   SELF CARE: (see below for minutes): Procedure(s) (per grid) utilized to improve and/or restore self-care/home management as related to dressing, bathing and grooming. Required minimal verbal cueing to facilitate activities of daily living skills and compensatory activities    Date: 7/15/24  Visit 1  EVAL 7/17/24  Visit 2 7/22/24  Visit 3     Modalities:                                Therapeutic Exercise:  30 min 30 min       Nustep lvl5 x 5 min Nustep lvl5 x 5 min    DKTC 2 x 20          LAQ 3 x 10 x 10#    Hamstring curls 3 x 10 x GTB    SL step ups on 6 inch box 2 x 10 SL step ups on 6 inch box 2 x 10       Heel toe walking in bars    Hurtle stepping in bars - firm -- soft mat Heel toe walking in bars    Hurtle stepping in bars - firm -- soft mat    Ball bounce    Ball drop catch with large green ball    Drop squat 2 x 10                             Proprioceptive Activities:                                Manual Therapy:  15 min 15 min       STM right glut ERs and hamstring Grade 4 right sciatic nerve tensioner 2 bouts              Functional Activities:                                            Treatment/Session Summary:    Treatment Assessment:   Patient had improved leg tension following sciatic nerve tensioners. Patient responded well to drop squats and ball catching drills.     Communication/Consultation:  Spoke with patient in regards to PT POC.  Equipment provided today:

## 2024-07-24 ENCOUNTER — APPOINTMENT (OUTPATIENT)
Dept: PHYSICAL THERAPY | Age: 89
End: 2024-07-24
Payer: MEDICARE

## 2024-07-24 ENCOUNTER — HOSPITAL ENCOUNTER (OUTPATIENT)
Dept: PHYSICAL THERAPY | Age: 89
Setting detail: RECURRING SERIES
Discharge: HOME | End: 2024-07-27
Attending: INTERNAL MEDICINE
Payer: MEDICARE

## 2024-07-24 PROCEDURE — 97110 THERAPEUTIC EXERCISES: CPT

## 2024-07-24 PROCEDURE — 97140 MANUAL THERAPY 1/> REGIONS: CPT

## 2024-07-24 NOTE — PROGRESS NOTES
Selam Hutchison  : 1935  Primary: [facility] Medicare Complete (Medicare Managed)  Secondary:  [facility] @ [place] [address]  Phone: [phone]  Fax: [phone] Plan Frequency: 1-2 sessions per week    Plan of Care/Certification Expiration Date: 10/14/24        Plan of Care/Certification Expiration Date:  Plan of Care/Certification Expiration Date: 10/14/24    Frequency/Duration:   Plan Frequency: 1-2 sessions per week      Time In/Out:   Time In: 0200  Time Out: 0245      PT Visit Info:         Visit Count:  18    OUTPATIENT PHYSICAL THERAPY:   Treatment Note 2024       Episode  (Unsteadiness and balance rehabilitation)               Treatment Diagnosis:    Unsteadiness on feet  Other abnormalities of gait and mobility  Balance problem  Muscle weakness (generalized)  Medical/Referring Diagnosis:    At high risk for falls  Dizziness  Orthostatic dizziness  Vertigo      Referring Physician:  Jagdish Nails DO MD Orders:  PT Eval and Treat   Return MD Appt:  TBD   Date of Onset:  Onset Date: 22     Allergies:   Hydrocodone-acetaminophen  Restrictions/Precautions:   None      Interventions Planned (Treatment may consist of any combination of the following):     See Assessment Note    Subjective Comments:   Notes was feeling sore on the drive today.   Initial Pain Level::     0 /10  Post Session Pain Level:        0/10  Medications Last Reviewed:  2024  Updated Objective Findings:  See Evaluation Note from today  Treatment   TREATMENT:   THERAPEUTIC ACTIVITY: ( see below for minutes): Therapeutic activities per grid below to improve mobility, strength, balance and coordination. Required minimal visual, verbal, manual and tactile cues to improve independence and safety with daily activities .  THERAPEUTIC EXERCISE: (see below for minutes): Exercises per grid below to improve mobility, strength, balance and coordination. Required minimal verbal and

## 2024-07-29 ENCOUNTER — HOSPITAL ENCOUNTER (OUTPATIENT)
Dept: PHYSICAL THERAPY | Age: 89
Setting detail: RECURRING SERIES
Discharge: HOME | End: 2024-08-01
Attending: INTERNAL MEDICINE
Payer: MEDICARE

## 2024-07-29 ENCOUNTER — APPOINTMENT (OUTPATIENT)
Dept: PHYSICAL THERAPY | Age: 89
End: 2024-07-29
Payer: MEDICARE

## 2024-07-29 PROCEDURE — 97140 MANUAL THERAPY 1/> REGIONS: CPT

## 2024-07-29 PROCEDURE — 97110 THERAPEUTIC EXERCISES: CPT

## 2024-07-29 NOTE — PROGRESS NOTES
coordination. Required minimal verbal and manual cues to promote proper body alignment, promote proper body posture and promote proper body mechanics. Progressed resistance, range, repetitions and complexity of movement as indicated.  MANUAL THERAPY: (see below for minutes): Joint mobilization and Soft tissue mobilization was utilized and necessary because of the patient's restricted joint motion, painful spasm, loss of articular motion and restricted motion of soft tissue.   MODALITIES: (see below for minutes): to decrease pain   SELF CARE: (see below for minutes): Procedure(s) (per grid) utilized to improve and/or restore self-care/home management as related to dressing, bathing and grooming. Required minimal verbal cueing to facilitate activities of daily living skills and compensatory activities    Date: 7/15/24  Visit 1  EVAL 7/17/24  Visit 2 7/22/24  Visit 3 7/24/24  Visit 4 7/29/24  Visit 5   Modalities:                                Therapeutic Exercise:  30 min 30 min 15 min 15 min     Nustep lvl5 x 5 min Nustep lvl5 x 5 min    DKTC 2 x 20    Nustep lvl5 x 5 min    SKTC 3 x 20 SKTC 1 x 20    PPT x 30     LAQ 3 x 10 x 10#    Hamstring curls 3 x 10 x GTB    SL step ups on 6 inch box 2 x 10 SL step ups on 6 inch box 2 x 10  SL step ups on 6 inch box 2 x 10     Heel toe walking in bars    Hurtle stepping in bars - firm -- soft mat Heel toe walking in bars    Hurtle stepping in bars - firm -- soft mat    Ball bounce    Ball drop catch with large green ball    Drop squat 2 x 10 Hurtle stepping in bars - firm -- soft mat    Ball bounce    Ball drop catch with small red bosu ball Hurtle stepping in bars - firm with ball bounce reaction catch before step over while holding ball                           Proprioceptive Activities:                                Manual Therapy:  15 min 15 min 30 min 30 min     STM right glut ERs and hamstring Grade 4 right sciatic nerve tensioner 2 bouts  Grade 4 right sciatic nerve

## 2024-07-31 ENCOUNTER — APPOINTMENT (OUTPATIENT)
Dept: PHYSICAL THERAPY | Age: 89
End: 2024-07-31
Payer: MEDICARE

## 2024-07-31 ENCOUNTER — HOSPITAL ENCOUNTER (OUTPATIENT)
Dept: PHYSICAL THERAPY | Age: 89
Setting detail: RECURRING SERIES
Discharge: HOME | End: 2024-08-03
Attending: INTERNAL MEDICINE
Payer: MEDICARE

## 2024-07-31 PROCEDURE — 97110 THERAPEUTIC EXERCISES: CPT

## 2024-07-31 PROCEDURE — 97140 MANUAL THERAPY 1/> REGIONS: CPT

## 2024-07-31 NOTE — PROGRESS NOTES
Selam Hutchison  : 1935  Primary: [facility] Medicare Complete (Medicare Managed)  Secondary:  [facility] [place] [address]  Phone: [phone]  Fax: [phone] Plan Frequency: 1-2 sessions per week    Plan of Care/Certification Expiration Date: 10/14/24        Plan of Care/Certification Expiration Date:  Plan of Care/Certification Expiration Date: 10/14/24    Frequency/Duration:   Plan Frequency: 1-2 sessions per week      Time In/Out:   Time In: 0415  Time Out: 0500      PT Visit Info:         Visit Count:  20    OUTPATIENT PHYSICAL THERAPY:   Treatment Note 2024       Episode  (Unsteadiness and balance rehabilitation)               Treatment Diagnosis:    Unsteadiness on feet  Other abnormalities of gait and mobility  Balance problem  Muscle weakness (generalized)  Medical/Referring Diagnosis:    At high risk for falls  Dizziness  Orthostatic dizziness  Vertigo      Referring Physician:  Jagdish Nails DO MD Orders:  PT Eval and Treat   Return MD Appt:  TBD   Date of Onset:  Onset Date: 22     Allergies:   Hydrocodone-acetaminophen  Restrictions/Precautions:   None      Interventions Planned (Treatment may consist of any combination of the following):     See Assessment Note    Subjective Comments:   Patient notes she is feeling sore in the glut today.   Initial Pain Level::     0 /10  Post Session Pain Level:        0/10  Medications Last Reviewed:  2024  Updated Objective Findings:  See Evaluation Note from today  Treatment   TREATMENT:   THERAPEUTIC ACTIVITY: ( see below for minutes): Therapeutic activities per grid below to improve mobility, strength, balance and coordination. Required minimal visual, verbal, manual and tactile cues to improve independence and safety with daily activities .  THERAPEUTIC EXERCISE: (see below for minutes): Exercises per grid below to improve mobility, strength, balance and coordination. Required minimal

## 2024-08-07 ENCOUNTER — HOSPITAL ENCOUNTER (OUTPATIENT)
Dept: PHYSICAL THERAPY | Age: 89
Setting detail: RECURRING SERIES
Discharge: HOME | End: 2024-08-10
Attending: INTERNAL MEDICINE
Payer: MEDICARE

## 2024-08-07 PROCEDURE — 97110 THERAPEUTIC EXERCISES: CPT

## 2024-08-07 PROCEDURE — 97140 MANUAL THERAPY 1/> REGIONS: CPT

## 2024-08-09 ENCOUNTER — HOSPITAL ENCOUNTER (OUTPATIENT)
Dept: PHYSICAL THERAPY | Age: 89
Setting detail: RECURRING SERIES
Discharge: HOME | End: 2024-08-12
Attending: INTERNAL MEDICINE
Payer: MEDICARE

## 2024-08-09 PROCEDURE — 97110 THERAPEUTIC EXERCISES: CPT

## 2024-08-09 NOTE — PROGRESS NOTES
PATTERNS.    >Total Treatment Billable Duration:  45 minutes   Time In: 1130  Time Out: 1215    SAGE DOWELL PT         Charge Capture  Events  [employer] Portal  Appt Desk  Attendance Report     Future Appointments   Date Time Provider Department Center   8/21/2024  2:00 PM Sage Dowell, PT [facility]   8/23/2024 11:30 AM Sage Dowell, PT [facility]   8/28/2024  2:00 PM Sage Dowell, PT [facility]   8/30/2024 11:30 AM Sage Dowell, PT [facility]   1/22/2025 10:30 AM Jagdish Nails DO [facility]

## 2024-08-14 ENCOUNTER — APPOINTMENT (OUTPATIENT)
Dept: PHYSICAL THERAPY | Age: 89
End: 2024-08-14
Attending: INTERNAL MEDICINE
Payer: MEDICARE

## 2024-08-16 ENCOUNTER — APPOINTMENT (OUTPATIENT)
Dept: PHYSICAL THERAPY | Age: 89
End: 2024-08-16
Attending: INTERNAL MEDICINE
Payer: MEDICARE

## 2024-08-21 ENCOUNTER — HOSPITAL ENCOUNTER (OUTPATIENT)
Dept: PHYSICAL THERAPY | Age: 89
Setting detail: RECURRING SERIES
Discharge: HOME | End: 2024-08-24
Attending: INTERNAL MEDICINE
Payer: MEDICARE

## 2024-08-21 PROCEDURE — 97110 THERAPEUTIC EXERCISES: CPT

## 2024-08-21 NOTE — PROGRESS NOTES
Selam Hutchison  : 1935  Primary: [facility] Medicare Complete (Medicare Managed)  Secondary:  [facility] [place] [address]  Phone: [phone]  Fax: [phone] Plan Frequency: 1-2 sessions per week    Plan of Care/Certification Expiration Date: 10/14/24        Plan of Care/Certification Expiration Date:  Plan of Care/Certification Expiration Date: 10/14/24    Frequency/Duration:   Plan Frequency: 1-2 sessions per week      Time In/Out:   Time In: 0200  Time Out: 0242      PT Visit Info:         Visit Count:  7    OUTPATIENT PHYSICAL THERAPY:   Progress Note/Treatment Note 2024       Episode  (Unsteadiness and balance rehabilitation)               Treatment Diagnosis:    Unsteadiness on feet  Other abnormalities of gait and mobility  Balance problem  Muscle weakness (generalized)  Medical/Referring Diagnosis:    At high risk for falls  Dizziness  Orthostatic dizziness  Vertigo      Referring Physician:  Jagdish Nails DO MD Orders:  PT Eval and Treat   Return MD Appt:  TBD   Date of Onset:  Onset Date: 22     Allergies:   Hydrocodone-acetaminophen  Restrictions/Precautions:   None      Interventions Planned (Treatment may consist of any combination of the following):     See Assessment Note    Subjective Comments:   Patient notes that her trip went well and she feels like her balance was better. She notes that family commented on her stability. She notes she was able to play putt putt and walk across several challenging surfaces without significant difficulty.   Initial Pain Level::     0 /10  Post Session Pain Level:        0/10  Medications Last Reviewed:  2024  Updated Objective Findings:      24:  2 min Step test: 32   Front reaction leans: good step reaction left and right  Left lateral reaction leans: good step reaction left  Right lateral reaction leans: poor step reaction \" my leg feels heavy\"     24 Progress Note:  2 Min Step

## 2024-08-23 ENCOUNTER — HOSPITAL ENCOUNTER (OUTPATIENT)
Dept: PHYSICAL THERAPY | Age: 89
Setting detail: RECURRING SERIES
Discharge: HOME | End: 2024-08-26
Attending: INTERNAL MEDICINE
Payer: MEDICARE

## 2024-08-23 PROCEDURE — 97140 MANUAL THERAPY 1/> REGIONS: CPT

## 2024-08-23 PROCEDURE — 97110 THERAPEUTIC EXERCISES: CPT

## 2024-08-23 NOTE — PROGRESS NOTES
Selam Hutchison  : 1935  Primary: [facility] Medicare Complete (Medicare Managed)  Secondary:  [facility] [place] [address]  Phone: [phone]  Fax: [phone] Plan Frequency: 1-2 sessions per week    Plan of Care/Certification Expiration Date: 10/14/24        Plan of Care/Certification Expiration Date:  Plan of Care/Certification Expiration Date: 10/14/24    Frequency/Duration:   Plan Frequency: 1-2 sessions per week      Time In/Out:   Time In: 1135  Time Out: 1215      PT Visit Info:         Visit Count:  8    OUTPATIENT PHYSICAL THERAPY:   Treatment Note 2024       Episode  (Unsteadiness and balance rehabilitation)               Treatment Diagnosis:    Unsteadiness on feet  Other abnormalities of gait and mobility  Balance problem  Muscle weakness (generalized)  Medical/Referring Diagnosis:    At high risk for falls  Dizziness  Orthostatic dizziness  Vertigo      Referring Physician:  Jagdish Nails DO MD Orders:  PT Eval and Treat   Return MD Appt:  TBD   Date of Onset:  Onset Date: 22     Allergies:   Hydrocodone-acetaminophen  Restrictions/Precautions:   None      Interventions Planned (Treatment may consist of any combination of the following):     See Assessment Note    Subjective Comments:   Patient notes she is feeling overall much better.   Initial Pain Level::     0 /10  Post Session Pain Level:        0/10  Medications Last Reviewed:  2024  Updated Objective Findings:      24:  2 min Step test: 32   Front reaction leans: good step reaction left and right  Left lateral reaction leans: good step reaction left  Right lateral reaction leans: poor step reaction \" my leg feels heavy\"     24 Progress Note:  2 Min Step Test: 32 steps   Right lateral reaction lean: showing improved speed and confidence    24:  Right hip flexion: limited vs left  Right hip ER: limited vs left  Right hip IR: sig limited vs left  Improved pain  [facility]   9/11/2024  2:00 PM Sage Dowell, PT [facility]   9/13/2024 11:30 AM Sage Dowell, PT [facility]   9/18/2024  2:00 PM Sage Dowell, PT [facility]   9/20/2024 11:30 AM Sage Dowell, PT [facility]   9/25/2024  2:00 PM Sage Dowell, PT [facility]   9/27/2024 11:30 AM Sage Dowell, PT [facility]   1/22/2025 10:30 AM Jagdish Nails DO [facility] DE[facility]

## 2024-08-28 ENCOUNTER — HOSPITAL ENCOUNTER (OUTPATIENT)
Dept: PHYSICAL THERAPY | Age: 89
Setting detail: RECURRING SERIES
Discharge: HOME | End: 2024-08-31
Attending: INTERNAL MEDICINE
Payer: MEDICARE

## 2024-08-28 PROCEDURE — 97110 THERAPEUTIC EXERCISES: CPT

## 2024-08-28 NOTE — PROGRESS NOTES
sig limited vs left  Improved pain post distraction and PPM and STM    8/28/24:  Cervical flexion: 75%   Cervical extension: 50%  Cervical rotation right: 75%  Cervical rotation left:75%  BP:148/90 mmHg  HR: 68 bpm RRR  James ferrari negative bilateral    Goals: (Goals have been discussed and agreed upon with patient.)  Short-Term Functional Goals: Time Frame: 8 wks +  Patient will demonstrate 5 second SLS with frontal plane compensatory strategies. PROGRESSING  Patient will demonstrate tandem stance for 10 seconds bilaterally with frontal plane compensatory strategies. PROGRESSING  Patient will demonstrate 20% improvement in 5 time sit to stand test. PROGRESSING  Discharge Goals: Time Frame: 12 wks +  Patient will report improved confidence with walking in her [employer]. PROGRESSING  Patient will report improved confidence with yard work activities. PROGRESSING  Patient will demonstrate 30 second single leg stance. CONTINUE  Patient will demonstrate 30 second tandem stance. CONTINUE  Treatment   TREATMENT:   THERAPEUTIC ACTIVITY: ( see below for minutes): Therapeutic activities per grid below to improve mobility, strength, balance and coordination. Required minimal visual, verbal, manual and tactile cues to improve independence and safety with daily activities .  THERAPEUTIC EXERCISE: (see below for minutes): Exercises per grid below to improve mobility, strength, balance and coordination. Required minimal verbal and manual cues to promote proper body alignment, promote proper body posture and promote proper body mechanics. Progressed resistance, range, repetitions and complexity of movement as indicated.  MANUAL THERAPY: (see below for minutes): Joint mobilization and Soft tissue mobilization was utilized and necessary because of the patient's restricted joint motion, painful spasm, loss of articular motion and restricted motion of soft tissue.   MODALITIES: (see below for minutes): to decrease pain   SELF CARE: (see  BALANCE, SINGLE LEG STABILITY FRONTAL PLANE COMPENSATORY PATTERNS.    >Total Treatment Billable Duration:  43 minutes   Time In: 0200  Time Out: 0243    SAGE DOWELL PT         Charge Capture  Events  [employer] Portal  Appt Desk  Attendance Report     Future Appointments   Date Time Provider Department Center   8/30/2024 11:30 AM Sage Dowell, PT [facility]   9/4/2024 11:45 AM Sage Dowell, PT [facility]   9/11/2024  2:00 PM Sage Dowell, PT [facility]   9/13/2024 11:30 AM Sage Dowell, PT [facility]   9/18/2024  2:00 PM Sgae Dowell, PT [facility]   9/20/2024 11:30 AM Sage Dowell, PT [facility]   9/25/2024  2:00 PM Sage Dowell, PT [facility]   9/27/2024 11:30 AM Sage Dowell, PT [facility]   1/22/2025 10:30 AM Jagdish Nails DO [facility] DE[facility]

## 2024-08-30 ENCOUNTER — HOSPITAL ENCOUNTER (OUTPATIENT)
Dept: PHYSICAL THERAPY | Age: 89
Setting detail: RECURRING SERIES
End: 2024-08-30
Attending: INTERNAL MEDICINE
Payer: MEDICARE

## 2024-08-30 PROCEDURE — 97110 THERAPEUTIC EXERCISES: CPT

## 2024-08-30 PROCEDURE — 97140 MANUAL THERAPY 1/> REGIONS: CPT

## 2024-08-30 NOTE — PROGRESS NOTES
Selam Hutchison  : 1935  Primary: [facility] Medicare Complete (Medicare Managed)  Secondary:  [facility] [place] [address]  Phone: [phone]  Fax: [phone] Plan Frequency: 1-2 sessions per week    Plan of Care/Certification Expiration Date: 10/14/24        Plan of Care/Certification Expiration Date:  Plan of Care/Certification Expiration Date: 10/14/24    Frequency/Duration:   Plan Frequency: 1-2 sessions per week      Time In/Out:   Time In: 1145  Time Out: 1231      PT Visit Info:         Visit Count:  10    OUTPATIENT PHYSICAL THERAPY:   Treatment Note 2024       Episode  (Unsteadiness and balance rehabilitation)               Treatment Diagnosis:    Unsteadiness on feet  Other abnormalities of gait and mobility  Balance problem  Muscle weakness (generalized)  Medical/Referring Diagnosis:    At high risk for falls  Dizziness  Orthostatic dizziness  Vertigo      Referring Physician:  Jagdish Nails DO MD Orders:  PT Eval and Treat   Return MD Appt:  TBD   Date of Onset:  Onset Date: 22     Allergies:   Hydrocodone-acetaminophen  Restrictions/Precautions:   None      Interventions Planned (Treatment may consist of any combination of the following):     See Assessment Note    Subjective Comments:   Right posterior hip is tight today and notes soreness. Patient notes no dizziness post last session.     Initial Pain Level::     0 /10  Post Session Pain Level:        0/10  Medications Last Reviewed:  2024  Updated Objective Findings:      24:  2 min Step test: 32   Front reaction leans: good step reaction left and right  Left lateral reaction leans: good step reaction left  Right lateral reaction leans: poor step reaction \" my leg feels heavy\"     24 Progress Note:  2 Min Step Test: 32 steps   Right lateral reaction lean: showing improved speed and confidence    24:  Right hip flexion: limited vs left  Right hip ER: limited vs  pain   SELF CARE: (see below for minutes): Procedure(s) (per grid) utilized to improve and/or restore self-care/home management as related to dressing, bathing and grooming. Required minimal verbal cueing to facilitate activities of daily living skills and compensatory activities    Date: 7/15/24  Visit 1  EVAL 7/17/24  Visit 2 7/22/24  Visit 3 7/24/24  Visit 4 7/29/24  Visit 5 7/31/24  Visit 6 8/7/24  Visit 7 8/9/24  Visit 8 8/21/24  Visit 9  Progress Note   8/23/24  Visit 10  8/28/24  Visit 11 8/30/24  Visit 12   Modalities:                                                            Therapeutic Exercise:  30 min 30 min 15 min 15 min 30 min 45 min 45 min 42 min 15 min 44 min 30 min     Nustep lvl5 x 5 min Nustep lvl5 x 5 min    DKTC 2 x 20    Nustep lvl5 x 5 min    SKTC 3 x 20 SKTC 1 x 20    PPT x 30 SKTC 1 x 20    PPT x 30    NuStep lvl 5 x 5 min    SKTC 1 x 20    PPT x 30    NuStep lvl 5 x 5 min NuStep lvl 5 x 5 min    Review of symptoms and progression [place] vega testing    Cervical testing Nustep x lvl 5 x 5 min     LAQ 3 x 10 x 10#    Hamstring curls 3 x 10 x GTB    SL step ups on 6 inch box 2 x 10 SL step ups on 6 inch box 2 x 10  SL step ups on 6 inch box 2 x 10 STS 3 x 10    Glut kick backs 2 x 15    Resisted side steps 4 laps x RTB   STS 3 x 10 x 0#,3#,7#    Glut kick backs 2 x 15    Resisted side steps 4 laps x RTB    STS 3 x 10 x 0#,5#,7#    Glut kick backs 2 x 15    Resisted side steps 4 laps x RTB    Bridges 3 x 10    SKTC x 15 cristel    STS 3 x 10 x 0#,5#,7#    Heel raises 3 x 10 Bridges  x 10, SL x 2 x 10    STS 4 x 10 x 3#,5#,7#, 10#    Sidelying hip abduction 3 x 10 x 2#    STS 3 x 10 x 10#    Resisted side steps 3 laps x RTB         Heel toe walking in bars    Hurtle stepping in bars - firm -- soft mat Heel toe walking in bars    Hurtle stepping in bars - firm -- soft mat    Ball bounce    Ball drop catch with large green ball    Drop squat 2 x 10 Hurtle stepping in bars - firm -- soft mat    Ball

## 2024-09-04 ENCOUNTER — HOSPITAL ENCOUNTER (OUTPATIENT)
Dept: PHYSICAL THERAPY | Age: 89
Setting detail: RECURRING SERIES
Discharge: HOME | End: 2024-09-07
Attending: INTERNAL MEDICINE
Payer: MEDICARE

## 2024-09-04 PROCEDURE — 97110 THERAPEUTIC EXERCISES: CPT

## 2024-09-04 NOTE — PROGRESS NOTES
Selam Hutchison  : 1935  Primary: [facility] Medicare Complete (Medicare Managed)  Secondary:  [facility] [address] [place] [address]  Phone: [phone]  Fax: [phone] Plan Frequency: 1-2 sessions per week    Plan of Care/Certification Expiration Date: 10/14/24        Plan of Care/Certification Expiration Date:  Plan of Care/Certification Expiration Date: 10/14/24    Frequency/Duration:   Plan Frequency: 1-2 sessions per week      Time In/Out:   Time In: 1146  Time Out: 1230      PT Visit Info:         Visit Count:  11    OUTPATIENT PHYSICAL THERAPY:   Treatment Note 2024       Episode  (Unsteadiness and balance rehabilitation)               Treatment Diagnosis:    Unsteadiness on feet  Other abnormalities of gait and mobility  Balance problem  Muscle weakness (generalized)  Medical/Referring Diagnosis:    At high risk for falls  Dizziness  Orthostatic dizziness  Vertigo      Referring Physician:  Jagdish Nails DO MD Orders:  PT Eval and Treat   Return MD Appt:  TBD   Date of Onset:  Onset Date: 22     Allergies:   Hydrocodone-acetaminophen  Restrictions/Precautions:   None      Interventions Planned (Treatment may consist of any combination of the following):     See Assessment Note    Subjective Comments:   Patient notes her posterior hip is feeling well no tightness.     Initial Pain Level::     0 /10  Post Session Pain Level:        0/10  Medications Last Reviewed:  2024  Updated Objective Findings:      24:  2 min Step test: 32   Front reaction leans: good step reaction left and right  Left lateral reaction leans: good step reaction left  Right lateral reaction leans: poor step reaction \" my leg feels heavy\"     24 Progress Note:  2 Min Step Test: 32 steps   Right lateral reaction lean: showing improved speed and confidence    24:  Right hip flexion: limited vs left  Right hip ER: limited vs left  Right hip IR: sig limited vs

## 2024-09-11 ENCOUNTER — HOSPITAL ENCOUNTER (OUTPATIENT)
Dept: PHYSICAL THERAPY | Age: 89
Setting detail: RECURRING SERIES
Discharge: HOME | End: 2024-09-14
Attending: INTERNAL MEDICINE
Payer: MEDICARE

## 2024-09-11 PROCEDURE — 97110 THERAPEUTIC EXERCISES: CPT

## 2024-09-11 PROCEDURE — 97140 MANUAL THERAPY 1/> REGIONS: CPT

## 2024-09-13 ENCOUNTER — HOSPITAL ENCOUNTER (OUTPATIENT)
Dept: PHYSICAL THERAPY | Age: 89
Setting detail: RECURRING SERIES
Discharge: HOME | End: 2024-09-16
Attending: INTERNAL MEDICINE
Payer: MEDICARE

## 2024-09-13 PROCEDURE — 97110 THERAPEUTIC EXERCISES: CPT

## 2024-09-13 PROCEDURE — 97140 MANUAL THERAPY 1/> REGIONS: CPT

## 2024-09-18 ENCOUNTER — APPOINTMENT (OUTPATIENT)
Dept: PHYSICAL THERAPY | Age: 89
End: 2024-09-18
Attending: INTERNAL MEDICINE
Payer: MEDICARE

## 2024-09-20 ENCOUNTER — APPOINTMENT (OUTPATIENT)
Dept: PHYSICAL THERAPY | Age: 89
End: 2024-09-20
Attending: INTERNAL MEDICINE
Payer: MEDICARE

## 2024-09-25 ENCOUNTER — APPOINTMENT (OUTPATIENT)
Dept: PHYSICAL THERAPY | Age: 89
End: 2024-09-25
Attending: INTERNAL MEDICINE
Payer: MEDICARE

## 2024-09-27 ENCOUNTER — APPOINTMENT (OUTPATIENT)
Dept: PHYSICAL THERAPY | Age: 89
End: 2024-09-27
Attending: INTERNAL MEDICINE
Payer: MEDICARE